# Patient Record
(demographics unavailable — no encounter records)

---

## 2025-04-28 NOTE — PROCEDURE
[Posterior Lesion] : posterior lesion [Anterior rhinoscopy insufficient to account for symptoms] : anterior rhinoscopy insufficient to account for symptoms [Topical Lidocaine] : topical lidocaine [Oxymetazoline HCl] : oxymetazoline HCl [Flexible Endoscope] : examined with the flexible endoscope [Serial Number: ___] : Serial Number: [unfilled] [Normal] : the nasopharynx was normal [de-identified] : done to rule out nasopharyngeal mass blocking et and causing otalgia could not see with speculum [FreeTextEntry6] : clear Mucosa: b nl Mucus:b nl Polyps:b nl Inferior turbinate:b nl Middle turbinate:b nl Superior turbinate:b nl Inferior Meatus:b nl Middle Meatus:b nl Superior meatus:b nl Sphenoethmoidal recess:b nl

## 2025-04-28 NOTE — HISTORY OF PRESENT ILLNESS
[de-identified] : 62 y/o F with h/o bilateral bppv, asthma p/w tinnitus and fullness of L ear x2 weeks. She states that she flew to [place] x2 weeks ago, noted fullness to l ear and tinnitus when landing. Woke up with vertigo the next day which lasted x3 days. Describes vertigo as room spinning sensation. States that she has h/o bilateral bppv.  Has established relationship with neurologist for management of bppv, Dr. Morrison. Took meclizine with relief. Returned to [place] and noted development of fullness to bilateral ears. Reports dysuria, chills, sweats, went to urgent care on 4/18. Was told that she had fluid to bilateral ears. Was tx'd with augmentin for UTI and serous otitis media. Dysuria resolved. Advised that urine culture was negative. She was also tx'd with albuterol and betamethasone 20mg x5 days for wheezing/cough. Tinnitus has improved overtime, worse in quiet environments. Noted hypersensitivity to hearing. Reports r burning sensation to r ear radiating to neck last week. Last vertigo event was April 5-7, states that she does not usually have vertigo with tinnitus. Reports last US carotid was last year, reports stenosis to R vertebral (does not have report with her), she was referred to neurovascular but did not go and her neurologist is leaving the practice. She does not know who to see and states a number of neurologists are covering her practice.. Reports h/o bruxism..  Denies h/o seasonal allergies. Denies URI prior to onset of symptoms. Denies h/o chronic loud noise exposure, h/o recurrent childhood ear infection, pe tube placement. No fh or sh relevant to cc.

## 2025-04-28 NOTE — PROCEDURE
[Posterior Lesion] : posterior lesion [Anterior rhinoscopy insufficient to account for symptoms] : anterior rhinoscopy insufficient to account for symptoms [Topical Lidocaine] : topical lidocaine [Oxymetazoline HCl] : oxymetazoline HCl [Flexible Endoscope] : examined with the flexible endoscope [Serial Number: ___] : Serial Number: [unfilled] [Normal] : the nasopharynx was normal [de-identified] : done to rule out nasopharyngeal mass blocking et and causing otalgia could not see with speculum [FreeTextEntry6] : clear Mucosa: b nl Mucus:b nl Polyps:b nl Inferior turbinate:b nl Middle turbinate:b nl Superior turbinate:b nl Inferior Meatus:b nl Middle Meatus:b nl Superior meatus:b nl Sphenoethmoidal recess:b nl

## 2025-04-28 NOTE — ASSESSMENT
[FreeTextEntry1] : 1. 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 vertigo   -vng -MRI brain and IAC -MRA of neck, MRA brain- h/o neurovascular issue rtc with tests -f/u with Moreno Reyes, neurology [facility], contact info provided discussed x 2 with her aunt on phone Dr Mirella Etienne, neurologist from [facility]. has meclizine

## 2025-04-28 NOTE — DATA REVIEWED
[de-identified] : audio exam: hearing wnl AU, type A tymps AU -results reviewed with patient [de-identified] : she did not bring outside imaging [de-identified] : she did not bring outside imaging

## 2025-04-28 NOTE — PHYSICAL EXAM
[Midline] : trachea located in midline position [Normal] : parotid gland, submandibular gland and thyroid glands are normal [de-identified] : b tmj [Nasal Endoscopy Performed] : nasal endoscopy was performed, see procedure section for findings

## 2025-04-28 NOTE — DATA REVIEWED
[de-identified] : audio exam: hearing wnl AU, type A tymps AU -results reviewed with patient [de-identified] : she did not bring outside imaging [de-identified] : she did not bring outside imaging

## 2025-04-28 NOTE — HISTORY OF PRESENT ILLNESS
[de-identified] : 62 y/o F with h/o bilateral bppv, asthma p/w tinnitus and fullness of L ear x2 weeks. She states that she flew to [place] x2 weeks ago, noted fullness to l ear and tinnitus when landing. Woke up with vertigo the next day which lasted x3 days. Describes vertigo as room spinning sensation. States that she has h/o bilateral bppv.  Has established relationship with neurologist for management of bppv, Dr. Morrison. Took meclizine with relief. Returned to [place] and noted development of fullness to bilateral ears. Reports dysuria, chills, sweats, went to urgent care on 4/18. Was told that she had fluid to bilateral ears. Was tx'd with augmentin for UTI and serous otitis media. Dysuria resolved. Advised that urine culture was negative. She was also tx'd with albuterol and betamethasone 20mg x5 days for wheezing/cough. Tinnitus has improved overtime, worse in quiet environments. Noted hypersensitivity to hearing. Reports r burning sensation to r ear radiating to neck last week. Last vertigo event was April 5-7, states that she does not usually have vertigo with tinnitus. Reports last US carotid was last year, reports stenosis to R vertebral (does not have report with her), she was referred to neurovascular but did not go and her neurologist is leaving the practice. She does not know who to see and states a number of neurologists are covering her practice.. Reports h/o bruxism..  Denies h/o seasonal allergies. Denies URI prior to onset of symptoms. Denies h/o chronic loud noise exposure, h/o recurrent childhood ear infection, pe tube placement. No fh or sh relevant to cc.

## 2025-04-28 NOTE — ASSESSMENT
[FreeTextEntry1] : 1. 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 vertigo   -vng -MRI brain and IAC -MRA of neck, MRA brain- h/o neurovascular issue rtc with tests -f/u with Moreno Reyes, neurology [facility], contact info provided discussed x 2 with her aunt on phone Dr Mirella Etienne, neurologist from [facility]. has meclizine

## 2025-04-28 NOTE — PHYSICAL EXAM
[Midline] : trachea located in midline position [Normal] : parotid gland, submandibular gland and thyroid glands are normal [de-identified] : b tmj [Nasal Endoscopy Performed] : nasal endoscopy was performed, see procedure section for findings